# Patient Record
Sex: FEMALE | Race: WHITE | NOT HISPANIC OR LATINO
[De-identification: names, ages, dates, MRNs, and addresses within clinical notes are randomized per-mention and may not be internally consistent; named-entity substitution may affect disease eponyms.]

---

## 2019-07-17 ENCOUNTER — RESULT REVIEW (OUTPATIENT)
Age: 44
End: 2019-07-17

## 2021-04-08 ENCOUNTER — RESULT REVIEW (OUTPATIENT)
Age: 46
End: 2021-04-08

## 2023-08-24 PROBLEM — Z00.00 ENCOUNTER FOR PREVENTIVE HEALTH EXAMINATION: Status: ACTIVE | Noted: 2023-08-24

## 2023-09-05 NOTE — ASU PATIENT PROFILE, ADULT - FALL HARM RISK - UNIVERSAL INTERVENTIONS
Bed in lowest position, wheels locked, appropriate side rails in place/Call bell, personal items and telephone in reach/Instruct patient to call for assistance before getting out of bed or chair/Non-slip footwear when patient is out of bed/[place] to call system/Physically safe environment - no spills, clutter or unnecessary equipment/Purposeful Proactive Rounding/Room/bathroom lighting operational, light cord in reach

## 2023-09-05 NOTE — ASU PATIENT PROFILE, ADULT - NSICDXPASTSURGICALHX_GEN_ALL_CORE_FT
PAST SURGICAL HISTORY:  History of in vitro fertilization 5 times    S/P LASIK surgery of both eyes

## 2023-09-05 NOTE — ASU PATIENT PROFILE, ADULT - NSICDXPASTMEDICALHX_GEN_ALL_CORE_FT
PAST MEDICAL HISTORY:  History of factor V Leiden mutation     SVT (supraventricular tachycardia)

## 2023-09-06 ENCOUNTER — TRANSCRIPTION ENCOUNTER (OUTPATIENT)
Age: 48
End: 2023-09-06

## 2023-09-06 RX ORDER — APREPITANT 80 MG/1
40 CAPSULE ORAL ONCE
Refills: 0 | Status: COMPLETED | OUTPATIENT
Start: 2023-09-07 | End: 2023-09-07

## 2023-09-06 RX ORDER — ACETAMINOPHEN 500 MG
1000 TABLET ORAL ONCE
Refills: 0 | Status: COMPLETED | OUTPATIENT
Start: 2023-09-07 | End: 2023-09-07

## 2023-09-07 ENCOUNTER — TRANSCRIPTION ENCOUNTER (OUTPATIENT)
Age: 48
End: 2023-09-07

## 2023-09-07 ENCOUNTER — OUTPATIENT (OUTPATIENT)
Dept: OUTPATIENT SERVICES | Facility: HOSPITAL | Age: 48
LOS: 1 days | Discharge: HOME | End: 2023-09-07
Payer: COMMERCIAL

## 2023-09-07 VITALS
DIASTOLIC BLOOD PRESSURE: 59 MMHG | TEMPERATURE: 99 F | HEIGHT: 61 IN | RESPIRATION RATE: 16 BRPM | HEART RATE: 84 BPM | SYSTOLIC BLOOD PRESSURE: 107 MMHG | WEIGHT: 99.21 LBS | OXYGEN SATURATION: 99 %

## 2023-09-07 VITALS
RESPIRATION RATE: 14 BRPM | HEART RATE: 83 BPM | SYSTOLIC BLOOD PRESSURE: 102 MMHG | DIASTOLIC BLOOD PRESSURE: 66 MMHG | OXYGEN SATURATION: 98 %

## 2023-09-07 DIAGNOSIS — Z98.890 OTHER SPECIFIED POSTPROCEDURAL STATES: Chronic | ICD-10-CM

## 2023-09-07 PROCEDURE — 88305 TISSUE EXAM BY PATHOLOGIST: CPT | Mod: 26

## 2023-09-07 RX ORDER — FENTANYL CITRATE 50 UG/ML
50 INJECTION INTRAVENOUS
Refills: 0 | Status: DISCONTINUED | OUTPATIENT
Start: 2023-09-07 | End: 2023-09-08

## 2023-09-07 RX ORDER — SODIUM CHLORIDE 9 MG/ML
500 INJECTION, SOLUTION INTRAVENOUS
Refills: 0 | Status: DISCONTINUED | OUTPATIENT
Start: 2023-09-07 | End: 2023-09-08

## 2023-09-07 RX ORDER — OXYCODONE HYDROCHLORIDE 5 MG/1
5 TABLET ORAL ONCE
Refills: 0 | Status: DISCONTINUED | OUTPATIENT
Start: 2023-09-07 | End: 2023-09-07

## 2023-09-07 RX ORDER — HYDROMORPHONE HYDROCHLORIDE 2 MG/ML
0.5 INJECTION INTRAMUSCULAR; INTRAVENOUS; SUBCUTANEOUS
Refills: 0 | Status: DISCONTINUED | OUTPATIENT
Start: 2023-09-07 | End: 2023-09-08

## 2023-09-07 RX ORDER — SODIUM CHLORIDE 9 MG/ML
500 INJECTION, SOLUTION INTRAVENOUS ONCE
Refills: 0 | Status: COMPLETED | OUTPATIENT
Start: 2023-09-07 | End: 2023-09-07

## 2023-09-07 RX ADMIN — SODIUM CHLORIDE 500 MILLILITER(S): 9 INJECTION, SOLUTION INTRAVENOUS at 10:49

## 2023-09-07 RX ADMIN — OXYCODONE HYDROCHLORIDE 5 MILLIGRAM(S): 5 TABLET ORAL at 11:02

## 2023-09-07 RX ADMIN — SODIUM CHLORIDE 120 MILLILITER(S): 9 INJECTION, SOLUTION INTRAVENOUS at 10:58

## 2023-09-07 RX ADMIN — Medication 1000 MILLIGRAM(S): at 06:55

## 2023-09-07 RX ADMIN — OXYCODONE HYDROCHLORIDE 5 MILLIGRAM(S): 5 TABLET ORAL at 11:32

## 2023-09-07 RX ADMIN — APREPITANT 40 MILLIGRAM(S): 80 CAPSULE ORAL at 06:55

## 2023-09-07 NOTE — PRE-ANESTHESIA EVALUATION ADULT - NSANTHADDINFOFT_GEN_ALL_CORE
Pt accepts all anesthesia risks IBNLT: Dental, Pharyngeal, Laryngeal, Tracheal Trauma, Sore Throat, Hoarseness, Recurrent Laryngeal Nerve Dysfunction, Upper and Lower Extremity Paresthesias, Nausea and Vomiting, Potential exacerbation of asthma and REYMUNDO.

## 2023-09-07 NOTE — ASU DISCHARGE PLAN (ADULT/PEDIATRIC) - NS MD DC FALL RISK RISK
For information on Fall & Injury Prevention, visit: [email or web address] OR  [email or web address] OR  https://www.cdc.gov/steadi/patient.html

## 2023-09-20 LAB — SURGICAL PATHOLOGY STUDY: SIGNIFICANT CHANGE UP

## (undated) DEVICE — DRSG KERLIX ROLL 4.5"

## (undated) DEVICE — DRSG GAUZE MOISTURIZER 0.5 OZ 4X8

## (undated) DEVICE — DRSG TELFA 3 X 8

## (undated) DEVICE — SUT PDS II 3-0 18" PS-1 UNDYED

## (undated) DEVICE — SUT ETHILON 5-0 18" PS-2

## (undated) DEVICE — NDL SPINAL 20G X 3.5" (YELLOW)

## (undated) DEVICE — DRSG STERISTRIPS 0.5 X 4"

## (undated) DEVICE — GOWN ROYAL SILK XL

## (undated) DEVICE — DRAPE TOWEL BLUE 17" X 24"

## (undated) DEVICE — STAPLER SKIN VISI-STAT 35 WIDE

## (undated) DEVICE — DRSG STERISTRIPS 1 X 5"

## (undated) DEVICE — PREP BETADINE KIT

## (undated) DEVICE — TUBING MICROAIRE ASPIRATION SET 12FT

## (undated) DEVICE — TUBING SUCTION NONCONDUCTIVE 6MM X 12FT

## (undated) DEVICE — POSITIONER FOAM EGG CRATE ULNAR 2PCS (PINK)

## (undated) DEVICE — TOURNIQUET ESMARK 4"

## (undated) DEVICE — SLV COMPRESSION KNEE MED

## (undated) DEVICE — MARKING PEN W RULER

## (undated) DEVICE — DRSG ACE BANDAGE 6"

## (undated) DEVICE — SUT PROLENE 3-0 18" PS-1

## (undated) DEVICE — DRAPE MEDIUM SHEET 44" X 70"

## (undated) DEVICE — WARMING BLANKET LOWER ADULT

## (undated) DEVICE — PACK BREAST

## (undated) DEVICE — SUT QUILL MONODERM 2-0 30CM 26MM

## (undated) DEVICE — GLV 6 PROTEXIS (WHITE)

## (undated) DEVICE — ELCTR STRYKER NEPTUNE BLADE COATED, INSULATED 70MM

## (undated) DEVICE — SUT MONOCRYL 3-0 18" PS-1

## (undated) DEVICE — ELCTR PENCIL SMOKE EVACUATOR COATED PUSH BUTTON 70MM

## (undated) DEVICE — ELCTR BOVIE TIP BLADE INSULATED 2.75" EDGE

## (undated) DEVICE — SUT VICRYL 2-0 18" TIES

## (undated) DEVICE — SUT QUILL MONODERM 2-0 30CM 24MM

## (undated) DEVICE — Device